# Patient Record
Sex: FEMALE | Race: WHITE | NOT HISPANIC OR LATINO
[De-identification: names, ages, dates, MRNs, and addresses within clinical notes are randomized per-mention and may not be internally consistent; named-entity substitution may affect disease eponyms.]

---

## 2019-07-18 ENCOUNTER — TRANSCRIPTION ENCOUNTER (OUTPATIENT)
Age: 54
End: 2019-07-18

## 2019-12-14 ENCOUNTER — TRANSCRIPTION ENCOUNTER (OUTPATIENT)
Age: 54
End: 2019-12-14

## 2021-12-20 ENCOUNTER — EMERGENCY (EMERGENCY)
Facility: HOSPITAL | Age: 56
LOS: 1 days | Discharge: HOME | End: 2021-12-20
Attending: EMERGENCY MEDICINE | Admitting: EMERGENCY MEDICINE
Payer: COMMERCIAL

## 2021-12-20 VITALS
HEIGHT: 66 IN | WEIGHT: 293 LBS | DIASTOLIC BLOOD PRESSURE: 89 MMHG | SYSTOLIC BLOOD PRESSURE: 132 MMHG | TEMPERATURE: 98 F | RESPIRATION RATE: 20 BRPM | HEART RATE: 78 BPM | OXYGEN SATURATION: 99 %

## 2021-12-20 LAB
ALBUMIN SERPL ELPH-MCNC: 4 G/DL — SIGNIFICANT CHANGE UP (ref 3.3–5)
ALP SERPL-CCNC: 67 U/L — SIGNIFICANT CHANGE UP (ref 30–120)
ALT FLD-CCNC: 25 U/L DA — SIGNIFICANT CHANGE UP (ref 10–60)
ANION GAP SERPL CALC-SCNC: 8 MMOL/L — SIGNIFICANT CHANGE UP (ref 5–17)
APTT BLD: 35.1 SEC — SIGNIFICANT CHANGE UP (ref 27.5–35.5)
AST SERPL-CCNC: 17 U/L — SIGNIFICANT CHANGE UP (ref 10–40)
BASOPHILS # BLD AUTO: 0.05 K/UL — SIGNIFICANT CHANGE UP (ref 0–0.2)
BASOPHILS NFR BLD AUTO: 0.5 % — SIGNIFICANT CHANGE UP (ref 0–2)
BILIRUB SERPL-MCNC: 0.3 MG/DL — SIGNIFICANT CHANGE UP (ref 0.2–1.2)
BUN SERPL-MCNC: 10 MG/DL — SIGNIFICANT CHANGE UP (ref 7–23)
CALCIUM SERPL-MCNC: 8.6 MG/DL — SIGNIFICANT CHANGE UP (ref 8.4–10.5)
CHLORIDE SERPL-SCNC: 103 MMOL/L — SIGNIFICANT CHANGE UP (ref 96–108)
CO2 SERPL-SCNC: 28 MMOL/L — SIGNIFICANT CHANGE UP (ref 22–31)
CREAT SERPL-MCNC: 0.77 MG/DL — SIGNIFICANT CHANGE UP (ref 0.5–1.3)
EOSINOPHIL # BLD AUTO: 0.12 K/UL — SIGNIFICANT CHANGE UP (ref 0–0.5)
EOSINOPHIL NFR BLD AUTO: 1.2 % — SIGNIFICANT CHANGE UP (ref 0–6)
GLUCOSE SERPL-MCNC: 90 MG/DL — SIGNIFICANT CHANGE UP (ref 70–99)
HCT VFR BLD CALC: 38.9 % — SIGNIFICANT CHANGE UP (ref 34.5–45)
HGB BLD-MCNC: 13.2 G/DL — SIGNIFICANT CHANGE UP (ref 11.5–15.5)
IMM GRANULOCYTES NFR BLD AUTO: 0.4 % — SIGNIFICANT CHANGE UP (ref 0–1.5)
INR BLD: 1.05 RATIO — SIGNIFICANT CHANGE UP (ref 0.88–1.16)
LYMPHOCYTES # BLD AUTO: 2.37 K/UL — SIGNIFICANT CHANGE UP (ref 1–3.3)
LYMPHOCYTES # BLD AUTO: 24.1 % — SIGNIFICANT CHANGE UP (ref 13–44)
MCHC RBC-ENTMCNC: 28.7 PG — SIGNIFICANT CHANGE UP (ref 27–34)
MCHC RBC-ENTMCNC: 33.9 GM/DL — SIGNIFICANT CHANGE UP (ref 32–36)
MCV RBC AUTO: 84.6 FL — SIGNIFICANT CHANGE UP (ref 80–100)
MONOCYTES # BLD AUTO: 0.66 K/UL — SIGNIFICANT CHANGE UP (ref 0–0.9)
MONOCYTES NFR BLD AUTO: 6.7 % — SIGNIFICANT CHANGE UP (ref 2–14)
NEUTROPHILS # BLD AUTO: 6.6 K/UL — SIGNIFICANT CHANGE UP (ref 1.8–7.4)
NEUTROPHILS NFR BLD AUTO: 67.1 % — SIGNIFICANT CHANGE UP (ref 43–77)
NRBC # BLD: 0 /100 WBCS — SIGNIFICANT CHANGE UP (ref 0–0)
PLATELET # BLD AUTO: 284 K/UL — SIGNIFICANT CHANGE UP (ref 150–400)
POTASSIUM SERPL-MCNC: 3.9 MMOL/L — SIGNIFICANT CHANGE UP (ref 3.5–5.3)
POTASSIUM SERPL-SCNC: 3.9 MMOL/L — SIGNIFICANT CHANGE UP (ref 3.5–5.3)
PROT SERPL-MCNC: 7.8 G/DL — SIGNIFICANT CHANGE UP (ref 6–8.3)
PROTHROM AB SERPL-ACNC: 12.7 SEC — SIGNIFICANT CHANGE UP (ref 10.6–13.6)
RBC # BLD: 4.6 M/UL — SIGNIFICANT CHANGE UP (ref 3.8–5.2)
RBC # FLD: 12.3 % — SIGNIFICANT CHANGE UP (ref 10.3–14.5)
SARS-COV-2 RNA SPEC QL NAA+PROBE: SIGNIFICANT CHANGE UP
SODIUM SERPL-SCNC: 139 MMOL/L — SIGNIFICANT CHANGE UP (ref 135–145)
TROPONIN I, HIGH SENSITIVITY RESULT: 6.5 NG/L — SIGNIFICANT CHANGE UP
WBC # BLD: 9.84 K/UL — SIGNIFICANT CHANGE UP (ref 3.8–10.5)
WBC # FLD AUTO: 9.84 K/UL — SIGNIFICANT CHANGE UP (ref 3.8–10.5)

## 2021-12-20 PROCEDURE — 99285 EMERGENCY DEPT VISIT HI MDM: CPT

## 2021-12-20 PROCEDURE — 71045 X-RAY EXAM CHEST 1 VIEW: CPT

## 2021-12-20 PROCEDURE — 87635 SARS-COV-2 COVID-19 AMP PRB: CPT

## 2021-12-20 PROCEDURE — 36415 COLL VENOUS BLD VENIPUNCTURE: CPT

## 2021-12-20 PROCEDURE — 93010 ELECTROCARDIOGRAM REPORT: CPT

## 2021-12-20 PROCEDURE — 84484 ASSAY OF TROPONIN QUANT: CPT

## 2021-12-20 PROCEDURE — 93005 ELECTROCARDIOGRAM TRACING: CPT

## 2021-12-20 PROCEDURE — 71045 X-RAY EXAM CHEST 1 VIEW: CPT | Mod: 26

## 2021-12-20 PROCEDURE — 85730 THROMBOPLASTIN TIME PARTIAL: CPT

## 2021-12-20 PROCEDURE — 85610 PROTHROMBIN TIME: CPT

## 2021-12-20 PROCEDURE — 99283 EMERGENCY DEPT VISIT LOW MDM: CPT | Mod: 25

## 2021-12-20 PROCEDURE — 80053 COMPREHEN METABOLIC PANEL: CPT

## 2021-12-20 PROCEDURE — 85025 COMPLETE CBC W/AUTO DIFF WBC: CPT

## 2021-12-20 NOTE — ED PROVIDER NOTE - CARE PROVIDER_API CALL
Matt Laguna (MD)  Cardiovascular Disease; Internal Medicine  [address]  [place] [address]  Phone: [phone]  Fax: [phone]  Follow Up Time: 1-3 Days

## 2021-12-20 NOTE — ED PROVIDER NOTE - OBJECTIVE STATEMENT
57 y/o female with PMHx of PCOS presents to the ED for evaluation s/p receiving Covid-19 booster. Pt states a few minutes after receiving her Covid-19 booster, she started palpitations and chest pain, described as a tightness. On arrival to ED, pt states symptoms have now resolved. Denies having an adverse reaction to her first two doses, besides feeling feverish afterwards. Not on any daily medications. PCP: Dr. Sultana. No other complaints at this time.

## 2021-12-20 NOTE — ED ADULT NURSE NOTE - WILL THE PATIENT ACCEPT THE PFIZER COVID-19 VACCINE IF ELIGIBLE AND IT IS AVAILABLE?
Not applicable Inflammation Suggestive Of Cancer Camouflage Histology Text: There was a dense lymphocytic infiltrate which prevented adequate histologic evaluation of adjacent structures.

## 2021-12-20 NOTE — ED PROVIDER NOTE - CLINICAL SUMMARY MEDICAL DECISION MAKING FREE TEXT BOX
Pt with chest tightness after Covid-19 booster. Symptoms now resolved. Plan for labs, CXR, EKG, and observe.

## 2021-12-20 NOTE — ED PROVIDER NOTE - INTERNATIONAL TRAVEL
Duration Of Freeze Thaw-Cycle (Seconds): 5
Post-Care Instructions: I reviewed with the patient in detail post-care instructions. Patient is to wear sunprotection, and avoid picking at any of the treated lesions. Pt may apply Vaseline to crusted or scabbing areas.
Render Post-Care Instructions In Note?: no
Detail Level: Detailed
Consent: The patient's consent was obtained including but not limited to risks of crusting, scabbing, blistering, scarring, darker or lighter pigmentary change, recurrence, incomplete removal and infection.
No

## 2021-12-20 NOTE — ED ADULT NURSE NOTE - OBJECTIVE STATEMENT
patient has a booster shot today and started c/o chest pain 5 mins after the shot, Patient denies sick contacts/ no fever/chills/cough at this time, denies SOB and no acute distress. IV accessed and tolerating. Labs drawn & sent results pending. ekg done. will continue to monitor.

## 2021-12-20 NOTE — ED PROVIDER NOTE - PATIENT PORTAL LINK FT
You can access the FollowMyHealth Patient Portal offered by [facility] by registering at the following website: [email or web address]. By joining [employer]’s FollowMyHealth portal, you will also be able to view your health information using other applications (apps) compatible with our system.

## 2022-11-14 NOTE — ED ADULT NURSE NOTE - NEURO GAIT
steady Propranolol Pregnancy And Lactation Text: This medication is Pregnancy Category C and it isn't known if it is safe during pregnancy. It is excreted in breast milk.

## 2023-02-10 ENCOUNTER — NON-APPOINTMENT (OUTPATIENT)
Age: 58
End: 2023-02-10

## 2023-05-19 NOTE — ED PROVIDER NOTE - CROS ED CARDIOVAS ALL NEG
Patient is called and he confirms appointment with provider for 05/25.  He will come right after his cardiology appointment.   - - -

## 2023-12-17 ENCOUNTER — EMERGENCY (EMERGENCY)
Facility: HOSPITAL | Age: 58
LOS: 1 days | Discharge: HOME | End: 2023-12-17
Attending: EMERGENCY MEDICINE | Admitting: EMERGENCY MEDICINE
Payer: COMMERCIAL

## 2023-12-17 VITALS
HEART RATE: 85 BPM | RESPIRATION RATE: 15 BRPM | DIASTOLIC BLOOD PRESSURE: 91 MMHG | TEMPERATURE: 98 F | SYSTOLIC BLOOD PRESSURE: 152 MMHG | WEIGHT: 154.98 LBS | HEIGHT: 66 IN | OXYGEN SATURATION: 99 %

## 2023-12-17 PROBLEM — E28.2 POLYCYSTIC OVARIAN SYNDROME: Chronic | Status: ACTIVE | Noted: 2021-12-20

## 2023-12-17 LAB
HCG UR QL: NEGATIVE — SIGNIFICANT CHANGE UP
HCG UR QL: NEGATIVE — SIGNIFICANT CHANGE UP

## 2023-12-17 PROCEDURE — 81025 URINE PREGNANCY TEST: CPT

## 2023-12-17 PROCEDURE — 73630 X-RAY EXAM OF FOOT: CPT

## 2023-12-17 PROCEDURE — 99283 EMERGENCY DEPT VISIT LOW MDM: CPT

## 2023-12-17 PROCEDURE — 99284 EMERGENCY DEPT VISIT MOD MDM: CPT

## 2023-12-17 PROCEDURE — 73630 X-RAY EXAM OF FOOT: CPT | Mod: 26,RT

## 2023-12-17 RX ORDER — ACETAMINOPHEN 500 MG
650 TABLET ORAL ONCE
Refills: 0 | Status: COMPLETED | OUTPATIENT
Start: 2023-12-17 | End: 2023-12-17

## 2023-12-17 RX ADMIN — Medication 650 MILLIGRAM(S): at 11:51

## 2023-12-17 RX ADMIN — Medication 650 MILLIGRAM(S): at 11:38

## 2023-12-17 NOTE — ED PROCEDURE NOTE - CPROC ED TIME OUT STATEMENT1
“Patient's name, , procedure and correct site were confirmed during the [place] Timeout.” “Patient's name, , procedure and correct site were confirmed during the [place] Timeout.”
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
## 2023-12-17 NOTE — ED ADULT NURSE NOTE - NSFALLUNIVINTERV_ED_ALL_ED
Bed/Stretcher in lowest position, wheels locked, appropriate side rails in place/Call bell, personal items and telephone in reach/Instruct patient to call for assistance before getting out of bed/chair/stretcher/Non-slip footwear applied when patient is off stretcher/[place] to call system/Physically safe environment - no spills, clutter or unnecessary equipment/Purposeful proactive rounding/Room/bathroom lighting operational, light cord in reach Bed/Stretcher in lowest position, wheels locked, appropriate side rails in place/Call bell, personal items and telephone in reach/Instruct patient to call for assistance before getting out of bed/chair/stretcher/Non-slip footwear applied when patient is off stretcher/[place] to call system/Physically safe environment - no spills, clutter or unnecessary equipment/Purposeful proactive rounding/Room/bathroom lighting operational, light cord in reach
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
## 2023-12-17 NOTE — ED PROVIDER NOTE - MUSCULOSKELETAL, MLM
Spine appears normal, range of motion is not limited, no muscle or joint tenderness. Right foot without visible bruising or deformity.  Full range of motion pulses and sensation intact throughout.

## 2023-12-17 NOTE — ED PROVIDER NOTE - NSFOLLOWUPINSTRUCTIONS_ED_ALL_ED_FT
Foot Sprain    WHAT YOU NEED TO KNOW:    A foot sprain is a stretched or torn ligament in the foot or toe. Ligaments are tough tissues that connect bones.    DISCHARGE INSTRUCTIONS:    Return to the emergency department if:    You have numbness or tingling below the injury, such as in your toes.    The skin on your injured foot is blue or pale.    You have increased pain, even after you take pain medicine.  Call your doctor if:    You have new weakness in your foot.    You have new or increased swelling in your foot.    You have new or increased stiffness when you move your injured foot.    You have questions or concerns about your condition or care.  Medicines:    NSAIDs, such as ibuprofen, help decrease swelling, pain, and fever. This medicine is available with or without a doctor's order. NSAIDs can cause stomach bleeding or kidney problems in certain people. If you take blood thinner medicine, always ask if NSAIDs are safe for you. Always read the medicine label and follow directions. Do not give these medicines to children younger than 6 months without direction from a healthcare provider.    Take your medicine as directed. Contact your healthcare provider if you think your medicine is not helping or if you have side effects. Tell your provider if you are allergic to any medicine. Keep a list of the medicines, vitamins, and herbs you take. Include the amounts, and when and why you take them. Bring the list or the pill bottles to follow-up visits. Carry your medicine list with you in case of an emergency.  Self-care:    Rest your foot. Limit movement in your sprained foot for the first 2 to 3 days. You might need crutches to take weight off your injured foot as it heals. Use crutches as directed.  Walking with Crutches      Apply ice on your foot for 15 to 20 minutes every hour or as directed. Use an ice pack, or put crushed ice in a plastic bag. Cover it with a towel. Ice helps prevent tissue damage and decreases swelling and pain.    Compress your foot. You may need to use tape or an elastic bandage to support your foot if you have a mild sprain. You may need a splint on your foot for support if your sprain is severe. Wear your splint for as many days as directed.    Elevate your foot above the level of your heart as often as you can. This will help decrease swelling and pain. Prop your foot on pillows or blankets to keep it elevated comfortably.  Elevate Leg  Exercise your foot: You may be given exercises to improve your strength and to help decrease stiffness. The exercises and physical therapy can help restore strength and increase the range of motion in your foot. Ask your healthcare provider when you can return to your normal activities or play sports.    Prevent another foot sprain:    Warm up and stretch before you exercise.    Do not exercise when you feel pain or are tired.    Wear equipment to protect yourself when you play sports.  Follow up with your doctor as directed: Write down your questions so you remember to ask them during your visits.

## 2023-12-17 NOTE — ED PROVIDER NOTE - CARE PROVIDER_API CALL
Wale Shaw.  Orthopaedic Surgery  [address], [address]  [place] [address]  Phone: [phone]  Fax: [phone]  Follow Up Time: 1-3 Days   Wale Shaw.  Orthopaedic Surgery  [address], [address]  [place] [address]  Phone: [phone]  Fax: [phone]  Follow Up Time: 1-3 Days
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
## 2023-12-17 NOTE — ED PROVIDER NOTE - ENMT, MLM
Airway patent, Nasal mucosa clear. Mouth with normal mucosa. Throat has no vesicles, no oropharyngeal exudates and uvula is midline.  Scalp is atraumatic nontender nondeformed.  Neck is supple full range of motion intact nontender nondeformed.

## 2023-12-17 NOTE — ED PROVIDER NOTE - PROVIDER TOKENS
PROVIDER:[TOKEN:[[id number]],FOLLOWUP:[1-3 Days]] PROVIDER:[TOKEN:[[id number]],FOLLOWUP:[1-3 Days]]

## 2023-12-17 NOTE — ED ADULT NURSE NOTE - DOES PATIENT HAVE ADVANCE DIRECTIVE
ED Attending Physician Note      Patient : Alysha Barreto Age: 36 year old Sex: female   MRN: [id number] Encounter Date: 1/13/2021      History     Chief Complaint   Patient presents with   • Tingling   • Facial Numbness     HPI  36-year-old woman with history of Bell's palsy with mild residual left-sided facial weakness presenting with left upper lip numbness and a tingling sensation to the back of her head since 2 PM yesterday.  States that at 2 PM yesterday she started having some left upper lip/numbness and noted some mild swelling to the right eye.  During this time she had a mild headache as well.  Symptoms improved slightly but today she had recurrence of the symptoms that started around 8 PM.  This time she notes that she had left face/cheek swelling associated with the headache and left with numbness.  She states that her headache feels like a throbbing migraine sensation to the back of her with associated tingling.  She denies any history of complex migraines but states that this feels like her usual headaches.  She denies any numbness weakness or tingling in her arms or legs.  No chest pain, mild nausea.  No vomiting.   No changes in her vision or speech.    PMH: Bell's palsy  Med: None  All: No known drug allergies  Fam: Noncontributory  Soc Hx: Social alcohol no tobacco no illicit drugs      ROS  General: No fever  Skin: No rash  Eye: No recent vision problems  ENMT: No sore throat  Respiratory: No shortness of breath  CV: No chest pain  GI: No abdominal pain  : No dysuria  MSK: No joint pain  Neurologic: + headache     Physical Exam     ED Triage Vitals [01/13/21 2145]   ED Triage Vitals Group      Temp 98.9 °F (37.2 °C)      Heart Rate 87      Resp 18      BP (!) 138/93      SpO2 99 %      EtCO2 mmHg       Height 5' 6\" (1.676 m)      Weight 257 lb 15 oz (117 kg)      Weight Scale Used Standing scale      BMI (Calculated) 41.63      IBW/kg (Calculated) 59.3       Pulse Ox is 99% on Room Air which is  normal for this patient    General: Alert, no acute distress  Skin: Warm, dry  Head: Normocephalic atraumatic  Eye: PERRL, EOMI  ENMT: Oral mucosa moist, no facial swelling noted  Neck: Supple, trachea midline  Cardiovascular: Regular rate, rhythm, S1, S2  Respiratory: Lungs CTA, non-labored respirations, symmetrical expansion  GI: Soft, nontender, nondistended  MSK: Normal ROM, no swelling, no deformity  Neuro: Cranial nerves II through XII intact except a slight decrease in wrinkling of her L forehead - old per patient, normal lip motions, level of consciousness appropriate for age, motor strength reveals 5 out of 5 strength to all 4 extremities with flexion and extension, sensation to all 4 extremities is intact, negative finger-to-nose testing speech normal, neg pronator drift, gait normal, NIHSS0  Psychiatric: Appropriate mood and affect      ED Course     Procedures    Lab Results     Results for orders placed or performed during the hospital encounter of 01/13/21   Comprehensive Metabolic Panel   Result Value Ref Range    Fasting Status      Sodium 136 135 - 145 mmol/L    Potassium 3.8 3.4 - 5.1 mmol/L    Chloride 100 98 - 107 mmol/L    Carbon Dioxide 27 21 - 32 mmol/L    Anion Gap 13 10 - 20 mmol/L    Glucose 104 (H) 65 - 99 mg/dL    BUN 13 6 - 20 mg/dL    Creatinine 0.63 0.51 - 0.95 mg/dL    Glomerular Filtration Rate >90 >90 mL/min/1.73m2    BUN/ Creatinine Ratio 21 7 - 25    Calcium 9.2 8.4 - 10.2 mg/dL    Bilirubin, Total 0.2 0.2 - 1.0 mg/dL    GOT/AST 24 <=37 Units/L    GPT/ALT 42 <64 Units/L    Alkaline Phosphatase 111 45 - 117 Units/L    Albumin 4.0 3.6 - 5.1 g/dL    Protein, Total 8.4 (H) 6.4 - 8.2 g/dL    Globulin 4.4 (H) 2.0 - 4.0 g/dL    A/G Ratio 0.9 (L) 1.0 - 2.4   Troponin I Ultra Sensitive   Result Value Ref Range    Troponin I, Ultra Sensitive <0.02 <=0.04 ng/mL   CBC with Automated Differential (performable only)   Result Value Ref Range    WBC 14.4 (H) 4.2 - 11.0 K/mcL    RBC 4.87 4.00 -  5.20 mil/mcL    HGB 13.2 12.0 - 15.5 g/dL    HCT 41.5 36.0 - 46.5 %    MCV 85.2 78.0 - 100.0 fl    MCH 27.1 26.0 - 34.0 pg    MCHC 31.8 (L) 32.0 - 36.5 g/dL    RDW-CV 13.2 11.0 - 15.0 %    RDW-SD 40.5 39.0 - 50.0 fL     140 - 450 K/mcL    NRBC 0 <=0 /100 WBC    Neutrophil, Percent 58 %    Lymphocytes, Percent 34 %    Mono, Percent 6 %    Eosinophils, Percent 1 %    Basophils, Percent 1 %    Immature Granulocytes 0 %    Absolute Neutrophils 8.3 (H) 1.8 - 7.7 K/mcL    Absolute Lymphocytes 4.9 (H) 1.0 - 4.8 K/mcL    Absolute Monocytes 0.9 0.3 - 0.9 K/mcL    Absolute Eosinophils  0.2 0.0 - 0.5 K/mcL    Absolute Basophils 0.1 0.0 - 0.3 K/mcL    Absolute Immmature Granulocytes 0.1 0.0 - 0.2 K/mcL       EKG Results   EKG is normal sinus rhythm at a rate of 88 with no acute ischemic changes, QTC is 384    Radiology Results     Imaging Results          CT HEAD WO CONTRAST (Final result)  Result time 01/13/21 22:50:31    Final result                 Impression:    No acute intracranial process.  There is no acute sinusitis..        Electronically Signed by: KATHLEEN ALTAMIRNAO MD   Signed on: 1/13/2021 10:50 PM                Narrative:    EXAMINATION: CT HEAD WO CONTRAST.    CLINICAL INDICATION: facial numbness.    COMPARISON: None.    Axial images of the head were obtained without administration of contrast  material.  Adjustment of the mA and/or kV was done based on the patient's size.     The ventricles are normal for age.  Basal cisterns and sulci over the  convexities are within normal limits.  No bleed, no shift and no mass  effect is seen.  No acute cortical infarct is appreciated.  Visualized  paranasal sinuses are clear.                                ED Medication Orders (From admission, onward)    Ordered Start     Status Ordering Provider    01/13/21 2221 01/13/21 2230  metoCLOPramide (REGLAN) injection 10 mg  ONCE      Last MAR action: Given JUSTYNA MARIA    01/13/21 2221 01/13/21 2230  diphenhydrAMINE  (BENADRYL) injection 25 mg  ONCE      Last MAR action: Given BEVERLY LIU    01/13/21 2221 01/13/21 2230  sodium chloride (NORMAL SALINE) 0.9 % bolus 1,000 mL  ONCE      Last MAR action: Stopped BEVERLY LIU          [facility]  36-year-old woman with history of Bell's palsy with mild residual left-sided facial weakness presenting with left upper lip numbness and a tingling sensation to the back of her head since 2 PM yesterday.  Afebrile with stable vital signs.  Physical exam is unremarkable with a nonfocal neuro exam.  There is a slight decrease wrinkling and her left forehead with eyebrow raise however patient states this is old.  Labs notable for white blood cell count of 14.4 but otherwise unremarkable.  EKG is normal sinus rhythm patient does have a headache and was given a migraine cocktail with improvement in her tingling as well as her headache.  Her CT brain is within normal limits.  Patient was discharged home in stable conditions.  She will follow-up with her primary care physician within the next.  Return parameters were given.       Clinical Impression     ED Diagnosis   1. Other migraine without status migrainosus, not intractable         Disposition        Discharge 1/13/2021 11:09 PM  Alysha Barreto discharge to home/self care.          Beverly Liu MD   1/14/2021 1:53 AM                        Beverly Liu MD  01/14/21 0158     No

## 2023-12-17 NOTE — ED PROVIDER NOTE - PATIENT PORTAL LINK FT
You can access the FollowMyHealth Patient Portal offered by [facility] by registering at the following website: [email or web address]. By joining [employer]’s FollowMyHealth portal, you will also be able to view your health information using other applications (apps) compatible with our system. You can access the FollowMyHealth Patient Portal offered by [facility] by registering at the following website: [email or web address]. By joining [employer]’s FollowMyHealth portal, you will also be able to view your health information using other applications (apps) compatible with our system.

## 2024-03-27 PROBLEM — Z00.00 ENCOUNTER FOR PREVENTIVE HEALTH EXAMINATION: Status: ACTIVE | Noted: 2024-03-27

## 2024-03-28 ENCOUNTER — APPOINTMENT (OUTPATIENT)
Dept: ORTHOPEDIC SURGERY | Facility: CLINIC | Age: 59
End: 2024-03-28
Payer: COMMERCIAL

## 2024-03-28 VITALS — WEIGHT: 155 LBS | BODY MASS INDEX: 24.91 KG/M2 | HEIGHT: 66 IN

## 2024-03-28 DIAGNOSIS — Z86.59 PERSONAL HISTORY OF OTHER MENTAL AND BEHAVIORAL DISORDERS: ICD-10-CM

## 2024-03-28 DIAGNOSIS — Z78.9 OTHER SPECIFIED HEALTH STATUS: ICD-10-CM

## 2024-03-28 PROCEDURE — 73030 X-RAY EXAM OF SHOULDER: CPT | Mod: RT

## 2024-03-28 PROCEDURE — 72040 X-RAY EXAM NECK SPINE 2-3 VW: CPT

## 2024-03-28 PROCEDURE — 20610 DRAIN/INJ JOINT/BURSA W/O US: CPT | Mod: RT

## 2024-03-28 PROCEDURE — 99204 OFFICE O/P NEW MOD 45 MIN: CPT | Mod: 25

## 2024-03-28 PROCEDURE — 73630 X-RAY EXAM OF FOOT: CPT | Mod: RT

## 2024-03-28 RX ORDER — PIROXICAM 20 MG/1
20 CAPSULE ORAL
Qty: 30 | Refills: 4 | Status: ACTIVE | COMMUNITY
Start: 2024-03-28 | End: 1900-01-01

## 2024-03-28 RX ORDER — METHYLPHENIDATE HYDROCHLORIDE 20 MG/1
20 CAPSULE, EXTENDED RELEASE ORAL
Refills: 0 | Status: ACTIVE | COMMUNITY
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
## 2024-03-28 NOTE — PROCEDURE
[Large Joint Injection] : Large joint injection [Subacromial Space] : subacromial space [Right] : of the right [Pain] : pain [Inflammation] : inflammation [Betadine] : betadine [Ethyl Chloride sprayed topically] : ethyl chloride sprayed topically [___ cc    1%] : Lidocaine ~Vcc of 1%  [___ cc    40mg] : Methylprednisolone (Depomedrol) ~Vcc of 40 mg  [] : Patient tolerated procedure well [Call if redness, pain or fever occur] : call if redness, pain or fever occur [Apply ice for 15min out of every hour for the next 12-24 hours as tolerated] : apply ice for 15 minutes out of every hour for the next 12-24 hours as tolerated [Risks, benefits, alternatives discussed / Verbal consent obtained] : the risks benefits, and alternatives have been discussed, and verbal consent was obtained

## 2024-03-28 NOTE — PHYSICAL EXAM
[Normal Mood and Affect] : normal mood and affect [Able to Communicate] : able to communicate [Well Developed] : well developed [Well Nourished] : well nourished [NL (45)] : extension 45 degrees [Rotation to left] : rotation to left [Rotation to right] : rotation to right [NL (0-90)] : full external rotation 0-90 degrees [NL (20)] : MTP joint PF 20 degrees [5___] : eversion 5[unfilled]/5 [Disc space narrowing] : Disc space narrowing [FreeTextEntry1] : Multilevel DDD from C4-C7 [de-identified] : left lateral flexion 30 degrees [de-identified] : left lateral rotation 75 degrees [TWNoteComboBox6] : right lateral rotation 60 degrees [There are no fractures, subluxations or dislocations. No significant abnormalities are seen] : There are no fractures, subluxations or dislocations. No significant abnormalities are seen [Type 2 acromion] : Type 2 acromion [TWNoteComboBox4] : passive forward flexion 170 degrees [de-identified] : + Jessica [] : no peroneal tendon tenderness [Right] : right foot [FreeTextEntry3] : small exostosis over 1st MT head [Degenerative change] : Degenerative change [FreeTextEntry9] : Pain at extremes of PF [de-identified] : moderate to severe assymetric narrowing of the 1st MTP joint with bone spur formation [de-identified] : MTP joint DF 20 degrees
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
## 2024-03-28 NOTE — PLAN
[TextEntry] : We discussed at length with the patient the options for treatment.  We discussed conservative care including physical therapy, acupuncture, massage therapy, and chiropractic care.  We discussed injection therapy and even surgical intervention should the patient fail conservative care.  We discussed risks, benefits, complications, alternatives, outcomes, expectations.  All questions answered.  Start Feldene daily.  Patient is being referred for physical therapy for various modalities.  Medication was injected into the rt SA arch. After verbal consent using sterile preparation and technique. The risks, benefits, and alternatives to cortisone injection were explained in full to the patient. Risks outlined include but are not limited to infection, sepsis, bleeding, scarring, skin discoloration, temporary increase in pain, syncopal episode, failure to resolve symptoms, allergic reaction, symptom recurrence, and elevation of blood sugar in diabetics. Patient understood the risks. All questions were answered. After discussion of options, patient requested an injection. Oral informed consent was obtained and sterile prep was done of the injection site. Sterile technique was utilized for the procedure including the preparation of the solutions used for the injection. Patient tolerated the procedure well. Advised to ice the injection site this evening. Prep with Betadine locally to site. Sterile technique used. Patient tolerated procedure well. Post Procedure Instructions: Patient was advised to call if redness, pain, or fever occur and apply ice for 15 min. out of every hour for the next 12-24 hours as tolerated.  The patient was instructed on the importance of ice and elevation of the extremity to decrease swelling and pain.  If no improvement, consider MRI.

## 2024-04-28 NOTE — ED ADULT TRIAGE NOTE - INTERNATIONAL TRAVEL
Pt has been having abdominal pain for the past 4 days. He has IBS. He has been taking \"Moran colon 4 in 1\" for gut health. Has been having sweats, nausea, and diarrhea Thursday through Friday. Had some blood in his stool. Felt faint yesterday. Pain is epigastric and hurts worse when he pushes on it.    No

## 2024-05-02 ENCOUNTER — APPOINTMENT (OUTPATIENT)
Dept: ORTHOPEDIC SURGERY | Facility: CLINIC | Age: 59
End: 2024-05-02

## 2024-05-02 NOTE — PHYSICAL EXAM
[FreeTextEntry1] : Multilevel DDD from C4-C7 [de-identified] : left lateral flexion 30 degrees [de-identified] : left lateral rotation 75 degrees [TWNoteComboBox6] : right lateral rotation 60 degrees [de-identified] : + Jessica [TWNoteComboBox4] : passive forward flexion 170 degrees [] : no peroneal tendon tenderness [FreeTextEntry3] : small exostosis over 1st MT head [FreeTextEntry9] : Pain at extremes of PF [de-identified] : moderate to severe assymetric narrowing of the 1st MTP joint with bone spur formation [de-identified] : MTP joint DF 20 degrees
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
## 2024-05-07 NOTE — ED ADULT NURSE NOTE - NS_NURSE_DISC_TEACHING_YN_ED_ALL_ED
Eloy,    Currently Fide Ar is scheduled for her Service-to SERVICE TO ALLERGY & IMMUNOLOGY  order on 9/19/2024. Due to the priority of the order, please review to determine that the appointment date aligns with the urgency of the diagnosis.    Thank you,  Soila Biggs    Care Coordination Operations Team  [facility]   
Yes, ok to schedule. She should continue to avoid peanut and carry epinephrine at all times. (Prior testing by pediatrician documents sensitization to peanut.)  
Yes

## 2024-05-21 ENCOUNTER — APPOINTMENT (OUTPATIENT)
Dept: ORTHOPEDIC SURGERY | Facility: CLINIC | Age: 59
End: 2024-05-21
Payer: COMMERCIAL

## 2024-05-21 PROCEDURE — 99214 OFFICE O/P EST MOD 30 MIN: CPT

## 2024-05-21 NOTE — PROCEDURE
[Subacromial Space] : subacromial space [Pain] : pain [Inflammation] : inflammation [Betadine] : betadine [Ethyl Chloride sprayed topically] : ethyl chloride sprayed topically [___ cc    1%] : Lidocaine ~Vcc of 1%  [___ cc    40mg] : Methylprednisolone (Depomedrol) ~Vcc of 40 mg  [] : Patient tolerated procedure well [Call if redness, pain or fever occur] : call if redness, pain or fever occur [Apply ice for 15min out of every hour for the next 12-24 hours as tolerated] : apply ice for 15 minutes out of every hour for the next 12-24 hours as tolerated [Risks, benefits, alternatives discussed / Verbal consent obtained] : the risks benefits, and alternatives have been discussed, and verbal consent was obtained [Large Joint Injection] : Large joint injection [Right] : of the right

## 2024-05-25 ENCOUNTER — APPOINTMENT (OUTPATIENT)
Dept: MRI IMAGING | Facility: CLINIC | Age: 59
End: 2024-05-25
Payer: COMMERCIAL

## 2024-05-25 PROCEDURE — 73221 MRI JOINT UPR EXTREM W/O DYE: CPT | Mod: RT

## 2024-05-31 ENCOUNTER — APPOINTMENT (OUTPATIENT)
Dept: ORTHOPEDIC SURGERY | Facility: CLINIC | Age: 59
End: 2024-05-31
Payer: COMMERCIAL

## 2024-05-31 VITALS — WEIGHT: 155 LBS | HEIGHT: 66 IN | BODY MASS INDEX: 24.91 KG/M2

## 2024-05-31 DIAGNOSIS — M75.41 IMPINGEMENT SYNDROME OF RIGHT SHOULDER: ICD-10-CM

## 2024-05-31 PROCEDURE — 99214 OFFICE O/P EST MOD 30 MIN: CPT
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
## 2024-05-31 NOTE — PLAN
[TextEntry] : We discussed at length with the patient the options for treatment.  We discussed conservative care including physical therapy, acupuncture, massage therapy, and chiropractic care.  We discussed injection therapy and even surgical intervention should the patient fail conservative care.  We discussed risks, benefits, complications, alternatives, outcomes, expectations.  All questions answered.  Pt needs to increase her PT to 3-4x/week to address her frozen shoulder.

## 2024-05-31 NOTE — PHYSICAL EXAM
[Normal Mood and Affect] : normal mood and affect [Able to Communicate] : able to communicate [Well Developed] : well developed [Well Nourished] : well nourished [NL (45)] : extension 45 degrees [Rotation to left] : rotation to left [Rotation to right] : rotation to right [Disc space narrowing] : Disc space narrowing [There are no fractures, subluxations or dislocations. No significant abnormalities are seen] : There are no fractures, subluxations or dislocations. No significant abnormalities are seen [Type 2 acromion] : Type 2 acromion [NL (20)] : MTP joint PF 20 degrees [5___] : eversion 5[unfilled]/5 [Right] : right foot [Degenerative change] : Degenerative change [FreeTextEntry1] : Multilevel DDD from C4-C7 [de-identified] : left lateral flexion 30 degrees [TWNoteComboBox6] : right lateral rotation 60 degrees [de-identified] : + Jessica [TWNoteComboBox4] : passive forward flexion 100 degrees [de-identified] : external rotation 70 degrees [] : no peroneal tendon tenderness [FreeTextEntry3] : small exostosis over 1st MT head [FreeTextEntry9] : Pain at extremes of PF [de-identified] : moderate to severe assymetric narrowing of the 1st MTP joint with bone spur formation [de-identified] : MTP joint DF 20 degrees
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
## 2024-05-31 NOTE — PHYSICAL EXAM
[Normal Mood and Affect] : normal mood and affect [Able to Communicate] : able to communicate [Well Developed] : well developed [Well Nourished] : well nourished [NL (45)] : extension 45 degrees [Rotation to left] : rotation to left [Rotation to right] : rotation to right [Disc space narrowing] : Disc space narrowing [NL (0-90)] : full external rotation 0-90 degrees [There are no fractures, subluxations or dislocations. No significant abnormalities are seen] : There are no fractures, subluxations or dislocations. No significant abnormalities are seen [Type 2 acromion] : Type 2 acromion [NL (20)] : MTP joint PF 20 degrees [5___] : eversion 5[unfilled]/5 [Right] : right foot [Degenerative change] : Degenerative change [FreeTextEntry1] : Multilevel DDD from C4-C7 [de-identified] : left lateral flexion 30 degrees [de-identified] : left lateral rotation 75 degrees [TWNoteComboBox6] : right lateral rotation 60 degrees [de-identified] : + Jessica [TWNoteComboBox4] : passive forward flexion 170 degrees [] : no peroneal tendon tenderness [FreeTextEntry3] : small exostosis over 1st MT head [FreeTextEntry9] : Pain at extremes of PF [de-identified] : moderate to severe assymetric narrowing of the 1st MTP joint with bone spur formation [de-identified] : MTP joint DF 20 degrees

## 2024-05-31 NOTE — PLAN
[TextEntry] : The patient was advised of the diagnosis. The natural history of the pathology was explained in full to the patient in layman's terms. All questions were answered. The risks and benefits of surgical and non-surgical treatment alternatives were explained in full to the patient.  Patient was advised to continue with physical therapy.  Will obtain an MRI rt shoulder.

## 2024-06-28 ENCOUNTER — APPOINTMENT (OUTPATIENT)
Dept: ORTHOPEDIC SURGERY | Facility: CLINIC | Age: 59
End: 2024-06-28
Payer: COMMERCIAL

## 2024-06-28 VITALS — HEIGHT: 66 IN | WEIGHT: 155 LBS | BODY MASS INDEX: 24.91 KG/M2

## 2024-06-28 DIAGNOSIS — M75.01 ADHESIVE CAPSULITIS OF RIGHT SHOULDER: ICD-10-CM

## 2024-06-28 DIAGNOSIS — M20.21 HALLUX RIGIDUS, RIGHT FOOT: ICD-10-CM

## 2024-06-28 DIAGNOSIS — M47.812 SPONDYLOSIS W/OUT MYELOPATHY OR RADICULOPATHY, CERVICAL REGION: ICD-10-CM

## 2024-06-28 DIAGNOSIS — S43.431A SUPERIOR GLENOID LABRUM LESION OF RIGHT SHOULDER, INITIAL ENCOUNTER: ICD-10-CM

## 2024-06-28 DIAGNOSIS — M54.2 CERVICALGIA: ICD-10-CM

## 2024-06-28 PROCEDURE — 99214 OFFICE O/P EST MOD 30 MIN: CPT

## 2024-08-01 ENCOUNTER — APPOINTMENT (OUTPATIENT)
Dept: ORTHOPEDIC SURGERY | Facility: CLINIC | Age: 59
End: 2024-08-01
Payer: COMMERCIAL

## 2024-08-01 DIAGNOSIS — M75.01 ADHESIVE CAPSULITIS OF RIGHT SHOULDER: ICD-10-CM

## 2024-08-01 DIAGNOSIS — M47.812 SPONDYLOSIS W/OUT MYELOPATHY OR RADICULOPATHY, CERVICAL REGION: ICD-10-CM

## 2024-08-01 DIAGNOSIS — M54.2 CERVICALGIA: ICD-10-CM

## 2024-08-01 DIAGNOSIS — M20.21 HALLUX RIGIDUS, RIGHT FOOT: ICD-10-CM

## 2024-08-01 DIAGNOSIS — S43.431A SUPERIOR GLENOID LABRUM LESION OF RIGHT SHOULDER, INITIAL ENCOUNTER: ICD-10-CM

## 2024-08-01 PROCEDURE — 99213 OFFICE O/P EST LOW 20 MIN: CPT

## 2024-08-01 RX ORDER — PROGESTERONE 200 MG/1
CAPSULE ORAL
Refills: 0 | Status: ACTIVE | COMMUNITY
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
## 2024-08-01 NOTE — PHYSICAL EXAM
[Normal Mood and Affect] : normal mood and affect [Able to Communicate] : able to communicate [Well Developed] : well developed [Well Nourished] : well nourished [NL (45)] : extension 45 degrees [Rotation to left] : rotation to left [Rotation to right] : rotation to right [Disc space narrowing] : Disc space narrowing [There are no fractures, subluxations or dislocations. No significant abnormalities are seen] : There are no fractures, subluxations or dislocations. No significant abnormalities are seen [Type 2 acromion] : Type 2 acromion [NL (20)] : MTP joint PF 20 degrees [5___] : eversion 5[unfilled]/5 [Right] : right foot [Degenerative change] : Degenerative change [FreeTextEntry1] : Multilevel DDD from C4-C7 [de-identified] : left lateral flexion 30 degrees [TWNoteComboBox6] : right lateral rotation 60 degrees [de-identified] : + Jessica [TWNoteComboBox4] : passive forward flexion 170 degrees [de-identified] : external rotation 85 degrees [] : no peroneal tendon tenderness [FreeTextEntry3] : small exostosis over 1st MT head [FreeTextEntry9] : Pain at extremes of PF [de-identified] : moderate to severe assymetric narrowing of the 1st MTP joint with bone spur formation [de-identified] : MTP joint DF 20 degrees
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
## 2024-08-01 NOTE — PLAN
[TextEntry] : The patient was advised of the diagnosis. The natural history of the pathology was explained in full to the patient in layman's terms. All questions were answered. The risks and benefits of surgical and non-surgical treatment alternatives were explained in full to the patient.   Patient was advised to continue with physical therapy mainly concentrating on her right shoulder.   Continue Feldene.

## 2024-08-01 NOTE — PHYSICAL EXAM
[Normal Mood and Affect] : normal mood and affect [Able to Communicate] : able to communicate [Well Developed] : well developed [Well Nourished] : well nourished [NL (45)] : extension 45 degrees [Rotation to left] : rotation to left [Rotation to right] : rotation to right [Disc space narrowing] : Disc space narrowing [There are no fractures, subluxations or dislocations. No significant abnormalities are seen] : There are no fractures, subluxations or dislocations. No significant abnormalities are seen [Type 2 acromion] : Type 2 acromion [NL (20)] : MTP joint PF 20 degrees [5___] : eversion 5[unfilled]/5 [Right] : right foot [Degenerative change] : Degenerative change [FreeTextEntry1] : Multilevel DDD from C4-C7 [de-identified] : left lateral flexion 30 degrees [TWNoteComboBox6] : right lateral rotation 60 degrees [de-identified] : + Jessica [TWNoteComboBox4] : passive forward flexion 170 degrees [de-identified] : external rotation 85 degrees [] : no peroneal tendon tenderness [FreeTextEntry3] : small exostosis over 1st MT head [FreeTextEntry9] : Pain at extremes of PF [de-identified] : moderate to severe assymetric narrowing of the 1st MTP joint with bone spur formation [de-identified] : MTP joint DF 20 degrees

## 2024-09-06 ENCOUNTER — APPOINTMENT (OUTPATIENT)
Dept: ORTHOPEDIC SURGERY | Facility: CLINIC | Age: 59
End: 2024-09-06
Payer: COMMERCIAL

## 2024-09-06 VITALS — BODY MASS INDEX: 24.91 KG/M2 | WEIGHT: 155 LBS | HEIGHT: 66 IN

## 2024-09-06 DIAGNOSIS — S43.431A SUPERIOR GLENOID LABRUM LESION OF RIGHT SHOULDER, INITIAL ENCOUNTER: ICD-10-CM

## 2024-09-06 DIAGNOSIS — M47.812 SPONDYLOSIS W/OUT MYELOPATHY OR RADICULOPATHY, CERVICAL REGION: ICD-10-CM

## 2024-09-06 DIAGNOSIS — M75.01 ADHESIVE CAPSULITIS OF RIGHT SHOULDER: ICD-10-CM

## 2024-09-06 PROCEDURE — 99213 OFFICE O/P EST LOW 20 MIN: CPT

## 2024-09-06 NOTE — PLAN
[TextEntry] : The patient was advised of the diagnosis. The natural history of the pathology was explained in full to the patient in layman's terms. All questions were answered. The risks and benefits of surgical and non-surgical treatment alternatives were explained in full to the patient.   Patient was advised to continue with physical therapy.   Defers injection today for her right shoulder.  Will get back more consistently on the Feldene and see how she does with that.
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
## 2024-09-06 NOTE — PHYSICAL EXAM
[Normal Mood and Affect] : normal mood and affect [Able to Communicate] : able to communicate [Well Developed] : well developed [Well Nourished] : well nourished [NL (45)] : extension 45 degrees [Rotation to left] : rotation to left [Rotation to right] : rotation to right [Disc space narrowing] : Disc space narrowing [There are no fractures, subluxations or dislocations. No significant abnormalities are seen] : There are no fractures, subluxations or dislocations. No significant abnormalities are seen [Type 2 acromion] : Type 2 acromion [NL (20)] : MTP joint PF 20 degrees [5___] : eversion 5[unfilled]/5 [Right] : right foot [Degenerative change] : Degenerative change [FreeTextEntry1] : Multilevel DDD from C4-C7 [de-identified] : left lateral flexion 30 degrees [TWNoteComboBox6] : right lateral rotation 60 degrees [de-identified] : + Jessica [TWNoteComboBox4] : passive forward flexion 170 degrees [de-identified] : external rotation 85 degrees [] : no peroneal tendon tenderness [FreeTextEntry3] : small exostosis over 1st MT head [FreeTextEntry9] : Pain at extremes of PF [de-identified] : moderate to severe assymetric narrowing of the 1st MTP joint with bone spur formation [de-identified] : MTP joint DF 20 degrees

## 2024-12-16 ENCOUNTER — EMERGENCY (EMERGENCY)
Facility: HOSPITAL | Age: 59
LOS: 1 days | Discharge: HOME | End: 2024-12-16
Attending: EMERGENCY MEDICINE | Admitting: EMERGENCY MEDICINE
Payer: COMMERCIAL

## 2024-12-16 VITALS
HEIGHT: 66 IN | SYSTOLIC BLOOD PRESSURE: 147 MMHG | RESPIRATION RATE: 19 BRPM | HEART RATE: 83 BPM | TEMPERATURE: 98 F | DIASTOLIC BLOOD PRESSURE: 85 MMHG | WEIGHT: 170.86 LBS | OXYGEN SATURATION: 99 %

## 2024-12-16 VITALS
OXYGEN SATURATION: 96 % | RESPIRATION RATE: 20 BRPM | DIASTOLIC BLOOD PRESSURE: 80 MMHG | HEART RATE: 64 BPM | TEMPERATURE: 98 F | SYSTOLIC BLOOD PRESSURE: 128 MMHG

## 2024-12-16 PROCEDURE — 99284 EMERGENCY DEPT VISIT MOD MDM: CPT

## 2024-12-16 PROCEDURE — 99283 EMERGENCY DEPT VISIT LOW MDM: CPT

## 2024-12-16 RX ORDER — PREDNISONE 20 MG/1
60 TABLET ORAL ONCE
Refills: 0 | Status: COMPLETED | OUTPATIENT
Start: 2024-12-16 | End: 2024-12-16

## 2024-12-16 RX ORDER — FAMOTIDINE 20 MG/1
20 TABLET, FILM COATED ORAL ONCE
Refills: 0 | Status: COMPLETED | OUTPATIENT
Start: 2024-12-16 | End: 2024-12-16

## 2024-12-16 RX ORDER — EPINEPHRINE 1 MG/ML(1)
0.3 AMPUL (ML) INJECTION
Qty: 1 | Refills: 0
Start: 2024-12-16

## 2024-12-16 RX ORDER — PREDNISONE 20 MG/1
2 TABLET ORAL
Qty: 12 | Refills: 0
Start: 2024-12-16 | End: 2024-12-21

## 2024-12-16 RX ADMIN — PREDNISONE 60 MILLIGRAM(S): 20 TABLET ORAL at 01:09

## 2024-12-16 RX ADMIN — FAMOTIDINE 20 MILLIGRAM(S): 20 TABLET, FILM COATED ORAL at 01:09

## 2024-12-16 NOTE — ED PROVIDER NOTE - NSFOLLOWUPINSTRUCTIONS_ED_ALL_ED_FT
Angioedema  Angioedema is the sudden swelling of tissue in the body. Angioedema can affect any part of the body, including the legs, hands, genitals, face, mouth, lips, and internal organs, like your intestines.    Depending on the cause, angioedema may happen just once. However, some people may have repeated bouts of angioedema during their lives.    Symptoms may be mild and may occur along with other allergic symptoms such as itchy, red, swollen areas of skin (hives). Severe angioedema can be life-threatening if it affects the air passages and blocks breathing.    What are the causes?  This condition may be caused by:  Foods, such as milk, eggs, shellfish, wheat, or nuts.  Certain medicines, such as ACE inhibitors, birth control pills, dyes used in X-rays, or NSAIDs, such as ibuprofen.  Hereditary angioedema (HAE) is genetic. Episodes can be triggered by:  Illness, infection, or emotional or physical stress.  Changes in hormone levels.  Exercise.  Minor surgical or dental procedures.  In some cases, the cause of this condition is not known.    What increases the risk?  You are more likely to develop HAE if you have family members with this condition.    What are the signs or symptoms?  A hand with angioedema compared to a normal hand.   Symptoms of this condition depend on where the swelling happens.    Symptoms of this condition include:  Swollen skin.  Hives.  Pain, pressure, or tenderness in the affected area.  Swollen eyelids, face, lips, or tongue.  Trouble drinking, swallowing, or closing the mouth completely.  Hoarseness or sore throat.  Wheezing or trouble breathing.  If your internal organs are affected, symptoms may also include:  Nausea.  Pain in the abdomen.  Vomiting or diarrhea.  Trouble swallowing.  Trouble passing urine.  How is this diagnosed?  This condition may be diagnosed based on:  An exam of the affected area.  Your medical history.  Whether anyone in your family has had this condition before.  A review of any medicines you have been taking.  Tests, including:  Allergy skin tests to see if the condition was caused by an allergic reaction.  Blood tests to see if the condition was caused by certain inherited or genetic diseases.  How is this treated?  Treatment for this condition depends on the cause and severity of your symptoms. It may involve any of the following:  Avoiding triggers, if they are known. Triggers may include foods or environmental allergens.  Stopping medicines permanently if they cause the condition. These include ACE inhibitors.  Taking medicines to treat symptoms or prevent future episodes. These may include:  Antihistamines.  Epinephrine injections.  Steroids.  Blood products to treat specific types of non-allergic angioedema.  Breathing tubes or ventilators in severe cases in which breathing is affected.  Severe cases of angioedema are treated at the hospital. Mild to moderate angioedema usually gets better in 24–48 hours.    Follow these instructions at home:  Medical alert bracelet.  Take over-the-counter and prescription medicines only as told by your health care provider.  If you were given medicines for emergency allergy treatment, always carry them with you. This includes epinephrine injector kits.  Wear a medical bracelet as told by your health care provider.  If something triggers your condition, avoid the trigger. Triggers can be foods, environmental allergens, stress, or exercise.  Avoid all medicines that caused your angioedema. This is for your entire life.  If your condition is inherited and you are thinking about having children, talk to your health care provider. It is important to discuss the risks of passing on the condition to your children.  Where to find more information  American Academy of Allergy Asthma & Immunology: www.aaaai.org  Contact a health care provider if:  You continue to have repeated episodes of angioedema.  Episodes of angioedema start to happen more often than they used to, even after you take steps to prevent them.  You have episodes of angioedema that are more severe than they have been before, even after you take steps to prevent them.  You are thinking about having children.  Get help right away if:  You have severe swelling of your mouth, tongue, or lips.  Your swelling gets worse.  You have trouble breathing, swallowing, or talking.  You have chest pain, dizziness or light-headedness, or you pass out.  These symptoms may represent a serious problem that is an emergency. Do not wait to see if the symptoms will go away. Get medical help right away. Call your local emergency services (911 in the [place] Do not drive yourself to the hospital.    Summary  Angioedema is the sudden swelling of tissues.  It is important to be aware of all triggers or causes for your angioedema and to avoid them.  Treatment for this condition depends on the cause and severity of your symptoms.  Severe angioedema can be life-threatening if it blocks the air passages.  This information is not intended to replace advice given to you by your health care provider. Make sure you discuss any questions you have with your health care provider.

## 2024-12-16 NOTE — ED PROVIDER NOTE - CARE PROVIDER_API CALL
Your allergist,   Phone: (   )    -  Fax: (   )    -  Follow Up Time: 1-3 Days    Raza Christian  Allergy and Immunology  [address], [address] [place] [address]  Phone: [phone]  Fax: [phone]  Follow Up Time:

## 2024-12-16 NOTE — ED PROVIDER NOTE - PATIENT PORTAL LINK FT
You can access the FollowMyHealth Patient Portal offered by [facility] by registering at the following website: [email or web address]. By joining [employer]’s FollowMyHealth portal, you will also be able to view your health information using other applications (apps) compatible with our system.

## 2024-12-16 NOTE — ED PROVIDER NOTE - PROVIDER TOKENS
FREE:[LAST:[Your allergist],PHONE:[(   )    -],FAX:[(   )    -],FOLLOWUP:[1-3 Days]],PROVIDER:[TOKEN:[[id number]]]

## 2024-12-16 NOTE — ED PROVIDER NOTE - CARE PROVIDERS DIRECT ADDRESSES
,DirectAddress_Unknown,[email or web address]

## 2024-12-16 NOTE — ED PROVIDER NOTE - OBJECTIVE STATEMENT
Patient presents with complaints of tingling and swelling of her lips and sensation of her throat tightening for about 15 minutes prior to arrival.  Patient had just eaten some ice cream and was getting ready for bed when she felt the symptoms.  States this has happened to her multiple times before and sometimes worse than what she is feeling now.  Patient took 2 Benadryl's and states that she feels it has helped slightly.  Feels her voice is raspy but no shortness of breath.  Has not noticed any itchy rash anywhere.  Patient has seen an allergist and has been tested with no known etiology for her symptoms.

## 2024-12-16 NOTE — ED ADULT NURSE NOTE - TEMPLATE
Ambulatory to bathroom, steady gait.   Neurosurgery Progress Note  Jaclyn Lovelace, [facility]          Admit Date: 3/2/2022   LOS: 6 days        Daily Progress Note: 3/8/2022    POD: 6    S/P: Procedure(s):  AIRO GUIDED RIGHT PARIETAL CRANIOTOMY AND TUMOR REMOVAL    HPI: The patient presented to the hospital last week due to intermittent left leg weakness. His head CT revealed cerebral edema and he transferred to [facility]. His MRI brain revealed a large right parietal brain mass and hi CT scans of his body showed no other evidence of disease. He was discharged to home on steroids and keppra with the intent of a planned readmission for surgery today. He underwent a right parietal craniotomy with resection of tumor on 22. In the recovery room, he states he could not move his left arm or left leg. In the ICU, his left leg has begun working again to where he is antigravity and able to lift it off the bed. He cannot move his LUE. While in the room, he began having a focal seizure of his left arm with rhythmic movements that terminated without intervention after about 20 seconds. Subjective:   Pt insurance auth for rehab obtained. His sodium level is low this morning at 127. It has been 134, but dropped to 127 this morning and is still 127 on repeat labs. Oncology evaluated the patient today. He was able to lift his left arm from the chair to about chest height unassisted. Denies chest pain, leg pain, nausea, vomiting, difficulty swallowing, and dyspnea. Objective:     Vital signs  Temp (24hrs), Av.7 °F (36.5 °C), Min:97.5 °F (36.4 °C), Max:97.9 °F (36.6 °C)   No intake/output data recorded.    1901 -  0700  In: -   Out: 450 [Urine:450]    Visit Vitals  /80 (BP 1 Location: Right arm, BP Patient Position: At rest)   Pulse 74   Temp 97.9 °F (36.6 °C)   Resp 22   Ht 5' 4\" (1.626 m)   Wt 136.2 kg (300 lb 4.3 oz)   SpO2 95%   BMI 51.54 kg/m²    O2 Flow Rate (L/min): 0 l/min O2 Device: None (Room air)     Pain control  Pain Assessment  Pain Scale 1: Numeric (0 - 10)  Pain Intensity 1: 0  Pain Onset 1: post op  Pain Location 1: Head  Pain Orientation 1: Other (comment)  Pain Description 1: Constant  Pain Intervention(s) 1: Medication (see MAR),Emotional support,Environmental changes,Repositioned    PT/OT  Gait     Gait  Base of Support: Narrowed  Gait Abnormalities: Trunk sway increased (L knee buckling and hyperextension)  Ambulation - Level of Assistance: Moderate assistance,Assist x2  Distance (ft): 3 Feet (ft) (x3 reps )  Assistive Device: Gait belt           Physical Exam:  Gen:NAD. Neuro: A&Ox3. Follows commands. Speech clear. Affect bright  PERRL. EOMI. Face symmetric. Tongue midline. RUE/RLE 5/5, LUE 3/5 hand intrinsics, 3/5 biceps, triceps, 2/5 deltoid, LLE 4+/5  Sensation decreased on left. Gait deferred. Skin: Right scalp incision C/D/I with sutures in place. No erythema, edema, or drainage. CT head without contrast on 03/03/22 shows postop right craniotomy and tumor resection. No hemorrhage. Decreased midline shift. 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     24 hour results:    Recent Results (from the past 24 hour(s))   GLUCOSE, POC    Collection Time: 03/07/22 11:56 AM   Result Value Ref Range    Glucose (POC) 119 (H) 65 - 117 mg/dL    Performed by Genesis Thompson    GLUCOSE, POC    Collection Time: 03/07/22  5:00 PM   Result Value Ref Range    Glucose (POC) 126 (H) 65 - 117 mg/dL    Performed by Genesis Thompson    PHENYTOIN    Collection Time: 03/08/22  2:02 AM   Result Value Ref Range    Phenytoin 14.7 10.0 - 20.0 ug/mL   RENAL FUNCTION PANEL    Collection Time: 03/08/22  2:02 AM   Result Value Ref Range    Sodium 127 (L) 136 - 145 mmol/L    Potassium 3.5 3.5 - 5.1 mmol/L    Chloride 88 (L) 97 - 108 mmol/L    CO2 32 21 - 32 mmol/L    Anion gap 7 5 - 15 mmol/L    Glucose 201 (H) 65 - 100 mg/dL BUN 31 (H) 6 - 20 MG/DL    Creatinine 1.14 0.70 - 1.30 MG/DL    BUN/Creatinine ratio 27 (H) 12 - 20      GFR est AA >60 >60 ml/min/1.73m2    GFR est non-AA >60 >60 ml/min/1.73m2    Calcium 8.4 (L) 8.5 - 10.1 MG/DL    Phosphorus 3.3 2.6 - 4.7 MG/DL    Albumin 2.9 (L) 3.5 - 5.0 g/dL   CBC WITH AUTOMATED DIFF    Collection Time: 03/08/22  2:02 AM   Result Value Ref Range    WBC 20.8 (H) 4.1 - 11.1 K/uL    RBC 5.38 4.10 - 5.70 M/uL    HGB 16.5 12.1 - 17.0 g/dL    HCT 50.3 36.6 - 50.3 %    MCV 93.5 80.0 - 99.0 FL    MCH 30.7 26.0 - 34.0 PG    MCHC 32.8 30.0 - 36.5 g/dL    RDW 12.3 11.5 - 14.5 %    PLATELET 977 379 - 696 K/uL    MPV 10.4 8.9 - 12.9 FL    NRBC 0.0 0  WBC    ABSOLUTE NRBC 0.00 0.00 - 0.01 K/uL    NEUTROPHILS 77 (H) 32 - 75 %    LYMPHOCYTES 12 12 - 49 %    MONOCYTES 9 5 - 13 %    EOSINOPHILS 0 0 - 7 %    BASOPHILS 0 0 - 1 %    IMMATURE GRANULOCYTES 2 (H) 0.0 - 0.5 %    ABS. NEUTROPHILS 16.0 (H) 1.8 - 8.0 K/UL    ABS. LYMPHOCYTES 2.5 0.8 - 3.5 K/UL    ABS. MONOCYTES 1.8 (H) 0.0 - 1.0 K/UL    ABS. EOSINOPHILS 0.0 0.0 - 0.4 K/UL    ABS. BASOPHILS 0.1 0.0 - 0.1 K/UL    ABS. IMM.  GRANS. 0.3 (H) 0.00 - 0.04 K/UL    DF AUTOMATED     GLUCOSE, POC    Collection Time: 03/08/22  7:13 AM   Result Value Ref Range    Glucose (POC) 160 (H) 65 - 117 mg/dL    Performed by Christ Luong    METABOLIC PANEL, BASIC    Collection Time: 03/08/22  9:37 AM   Result Value Ref Range    Sodium 127 (L) 136 - 145 mmol/L    Potassium 3.5 3.5 - 5.1 mmol/L    Chloride 88 (L) 97 - 108 mmol/L    CO2 34 (H) 21 - 32 mmol/L    Anion gap 5 5 - 15 mmol/L    Glucose 140 (H) 65 - 100 mg/dL    BUN 28 (H) 6 - 20 MG/DL    Creatinine 0.96 0.70 - 1.30 MG/DL    BUN/Creatinine ratio 29 (H) 12 - 20      GFR est AA >60 >60 ml/min/1.73m2    GFR est non-AA >60 >60 ml/min/1.73m2    Calcium 8.7 8.5 - 10.1 MG/DL   SARS-COV-2    Collection Time: 03/08/22  9:39 AM   Result Value Ref Range    SARS-CoV-2 Please find results under separate order     COVID-19 RAPID TEST    Collection Time: 03/08/22  9:39 AM   Result Value Ref Range    Specimen source Nasopharyngeal      COVID-19 rapid test Not detected NOTD            Assessment:     Active Problems:    Brain mass (2/21/2022)        Plan:   1. Right parietal brain mass   - s/p crani 03/02   - cont decadron taper   - cont keppra   - PT/OT   - Pathology pending.    -Med onc and rad onc consulted. Tx would not start for 3-4 weeks  2. Cerebral edema with brain compression   - due to #1   - plans as above  3. Hx of CHF   - EF of 35% on last ECHO   - cont Coreg   - Hospitalist following  4. Focal seizures   - due to #1, 2   - cont keppra to 1000 mg bid   - PO dilantin 100 mg tid. Phenytoin level is therapeutic   - ativan PRN for seizures lasting greater than 2 minutes or more than 3 in an hour. 5. HTN   - SBP<140   - cont coreg, lisinopril from home. Torsemide restarted. - Hospitalist following  6. Diabetes mellitus, type 2   - -209   - SSI and accu-checks   - Hospitalist following  7. Morbid obesity as defined by BMI greater than 40   - Body mass index is 51.54 kg/m². - Diet and exercise counseling as appropriate  8. Hyponatremia   - Na 127 on repeat labs   - urine sodium and urine osmo pending   - will consult nephrology. Pt may need fluid restriction pending urine studies. Activity: up with assist  DVT ppx: SCDs  Dispo: inpt rehab    Plan d/w nurse, case management. Called rad onc to make them aware of the consult. Will discuss with Dr. Nohemy Gross. Inpt rehab has bed an auth but need to see what nephrology recommends. He may need one more day of observation with his sodium level before he goes to inpt rehab. Pepe Geller, NP   03/08/22 1210    Spoke with nephrology. Will enact fluid restriction. Repeat labs in am and if stable will be able to go to inpatient rehab tomorrow and be followed at [facility] by nephrology team. Updated case management.     Kurt [facility] Allergic [facility]

## 2024-12-18 NOTE — ED ADULT NURSE NOTE - PUPILS PERRL
Outpatient Physical Therapy  [place]           [x] Phone: [phone]   Fax: [phone]  [place]           [] Phone: [phone]   Fax: [phone]        Physical Therapy Daily Treatment Note  Date:  2024    Patient Name:  Emily He    :  1974  MRN: [id number]  Restrictions/Precautions: No data recorded      Diagnosis:   Neck pain [M54.2]    Date of Injury/Surgery:   Treatment Diagnosis:  cervical DDD and impringement L radiating symptoms. stiffness, hypomobility , posture  Insurance/Certification information: johann ( 30 PCY) - pre-cert after visit #8?  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 ind with HEP for postue and flexibility  met  2. reports 50% less L hand radiating symptoms  not met  3. cervical ROM Rot R and L  40 deg  met  4.  50% less TTP in the L lower cervical area and UT  not met  Long Term Goals  Time Frame for Long Term Goals: 6-8 weeks  1. report 75% better overall  in the neck and her L radiating symtoms with ADL and housework  not met  2. demo good cervical posture  met  3. L  improved to 50 #  met  4. cervical ROM Ext 45 flex 55 with no increaed neck pan or L radiating symptoms  not met  5. NDI improved to 11 pts or less   not met        Summary of Evaluation:  Assessment: Pt appears with DDD in the cervical area and osteophytes, she has EMG noting compression of C7-8 nerve root L. she reports that Dr Vaughn stated no surgery was indicated. she is having neck pain and L UE to the hand pain and tingling / weakness. She notes she is not able to tolerate her 
yes

## 2025-03-17 ENCOUNTER — EMERGENCY (EMERGENCY)
Facility: HOSPITAL | Age: 60
LOS: 1 days | Discharge: HOME | End: 2025-03-17
Attending: EMERGENCY MEDICINE | Admitting: EMERGENCY MEDICINE
Payer: COMMERCIAL

## 2025-03-17 VITALS
SYSTOLIC BLOOD PRESSURE: 123 MMHG | HEART RATE: 78 BPM | OXYGEN SATURATION: 98 % | RESPIRATION RATE: 18 BRPM | TEMPERATURE: 98 F | DIASTOLIC BLOOD PRESSURE: 78 MMHG

## 2025-03-17 VITALS
HEART RATE: 80 BPM | HEIGHT: 66 IN | TEMPERATURE: 98 F | SYSTOLIC BLOOD PRESSURE: 163 MMHG | WEIGHT: 165.35 LBS | OXYGEN SATURATION: 98 % | DIASTOLIC BLOOD PRESSURE: 84 MMHG | RESPIRATION RATE: 18 BRPM

## 2025-03-17 PROCEDURE — 99284 EMERGENCY DEPT VISIT MOD MDM: CPT

## 2025-03-17 PROCEDURE — 96374 THER/PROPH/DIAG INJ IV PUSH: CPT

## 2025-03-17 PROCEDURE — 99284 EMERGENCY DEPT VISIT MOD MDM: CPT | Mod: 25

## 2025-03-17 PROCEDURE — 96375 TX/PRO/DX INJ NEW DRUG ADDON: CPT

## 2025-03-17 RX ORDER — PREDNISONE 20 MG/1
2 TABLET ORAL
Qty: 10 | Refills: 0
Start: 2025-03-17 | End: 2025-03-21

## 2025-03-17 RX ORDER — METHYLPHENIDATE HCL 20 MG
0 TABLET, EXTENDED RELEASE ORAL
Refills: 0 | DISCHARGE

## 2025-03-17 RX ORDER — METHYLPREDNISOLONE ACETATE 80 MG/ML
125 INJECTION, SUSPENSION INTRA-ARTICULAR; INTRALESIONAL; INTRAMUSCULAR; SOFT TISSUE ONCE
Refills: 0 | Status: COMPLETED | OUTPATIENT
Start: 2025-03-17 | End: 2025-03-17

## 2025-03-17 RX ORDER — SPIRONOLACTONE 25 MG
0 TABLET ORAL
Refills: 0 | DISCHARGE

## 2025-03-17 RX ADMIN — METHYLPREDNISOLONE ACETATE 125 MILLIGRAM(S): 80 INJECTION, SUSPENSION INTRA-ARTICULAR; INTRALESIONAL; INTRAMUSCULAR; SOFT TISSUE at 01:25

## 2025-03-17 RX ADMIN — Medication 20 MILLIGRAM(S): at 01:25

## 2025-03-17 NOTE — ED ADULT NURSE NOTE - NSICDXPASTMEDICALHX_GEN_ALL_CORE_FT
PAST MEDICAL HISTORY:  ADHD     PCOS (polycystic ovarian syndrome)      PAST MEDICAL HISTORY:  ADHD     H/O angioedema     PCOS (polycystic ovarian syndrome)

## 2025-03-17 NOTE — ED PROVIDER NOTE - PROVIDER TOKENS
FREE:[LAST:[Follow Up With Dr. Cardenas, your primary care provider],PHONE:[(   )    -],FAX:[(   )    -],FOLLOWUP:[1-3 Days]]

## 2025-03-17 NOTE — ED PROVIDER NOTE - PROGRESS NOTE DETAILS
feels improvement, symptoms slight now, will continue to observe until pt feels improved and ready for discharge

## 2025-03-17 NOTE — ED PROVIDER NOTE - CLINICAL SUMMARY MEDICAL DECISION MAKING FREE TEXT BOX
allergic reaction, took benedryl at home, will give pepcid and solumedrol now, wants to hold off on epi, has never needed it before, will reassess symptoms, if worsening related to breathing/oropharynx will give epi

## 2025-03-17 NOTE — ED PROVIDER NOTE - CARE PROVIDER_API CALL
Follow Up With Dr. Cardenas, your primary care provider,   Phone: (   )    -  Fax: (   )    -  Follow Up Time: 1-3 Days

## 2025-03-17 NOTE — ED PROVIDER NOTE - PATIENT PORTAL LINK FT
You can access the FollowMyHealth Patient Portal offered by [facility] by registering at the following website: [email or web address]. By joining [employer]’s FollowMyHealth portal, you will also be able to view your health information using other applications (apps) compatible with our system.

## 2025-03-17 NOTE — ED ADULT NURSE NOTE - LOCATION
Apply ketoconazole cream to the affected area of your groin once daily for 2 weeks.    Please follow-up with a primary care provider in 1-2 days.   throat tightness, lip swelling 20 min age took 2 tim at 1230

## 2025-03-17 NOTE — ED PROVIDER NOTE - NS_EDPROVIDERDISPOUSERTYPE_ED_A_ED
"Well  - 24 Months Old  PHYSICAL DEVELOPMENT  Your 24-month-old may begin to show a preference for using one hand over the other. At this age he or she can:   · Walk and run.    · Kick a ball while standing without losing his or her balance.  · Jump in place and jump off a bottom step with two feet.  · Hold or pull toys while walking.    · Climb on and off furniture.    · Turn a door knob.  · Walk up and down stairs one step at a time.    · Unscrew lids that are secured loosely.    · Build a tower of five or more blocks.    · Turn the pages of a book one page at a time.  SOCIAL AND EMOTIONAL DEVELOPMENT  Your child:   · Demonstrates increasing independence exploring his or her surroundings.    · May continue to show some fear (anxiety) when  from parents and in new situations.    · Frequently communicates his or her preferences through use of the word \"no.\"    · May have temper tantrums. These are common at this age.    · Likes to imitate the behavior of adults and older children.  · Initiates play on his or her own.  · May begin to play with other children.    · Shows an interest in participating in common household activities    · Shows possessiveness for toys and understands the concept of \"mine.\" Sharing at this age is not common.    · Starts make-believe or imaginary play (such as pretending a bike is a motorcycle or pretending to cook some food).  COGNITIVE AND LANGUAGE DEVELOPMENT  At 24 months, your child:  · Can point to objects or pictures when they are named.  · Can recognize the names of familiar people, pets, and body parts.    · Can say 50 or more words and make short sentences of at least 2 words. Some of your child's speech may be difficult to understand.    · Can ask you for food, for drinks, or for more with words.  · Refers to himself or herself by name and may use I, you, and me, but not always correctly.  · May stutter. This is common.  · May repeat words overheard during other " "people's conversations.    · Can follow simple two-step commands (such as \"get the ball and throw it to me\").    · Can identify objects that are the same and sort objects by shape and color.  · Can find objects, even when they are hidden from sight.  ENCOURAGING DEVELOPMENT  · Recite nursery rhymes and sing songs to your child.    · Read to your child every day. Encourage your child to point to objects when they are named.    · Name objects consistently and describe what you are doing while bathing or dressing your child or while he or she is eating or playing.    · Use imaginative play with dolls, blocks, or common household objects.  · Allow your child to help you with household and daily chores.  · Provide your child with physical activity throughout the day. (For example, take your child on short walks or have him or her play with a ball or ambrose bubbles.)  · Provide your child with opportunities to play with children who are similar in age.  · Consider sending your child to .  · Minimize television and computer time to less than 1 hour each day. Children at this age need active play and social interaction. When your child does watch television or play on the computer, do it with him or her. Ensure the content is age-appropriate. Avoid any content showing violence.  · Introduce your child to a second language if one spoken in the household.    ROUTINE IMMUNIZATIONS  · Hepatitis B vaccine. Doses of this vaccine may be obtained, if needed, to catch up on missed doses.    · Diphtheria and tetanus toxoids and acellular pertussis (DTaP) vaccine. Doses of this vaccine may be obtained, if needed, to catch up on missed doses.    · Haemophilus influenzae type b (Hib) vaccine. Children with certain high-risk conditions or who have missed a dose should obtain this vaccine.    · Pneumococcal conjugate (PCV13) vaccine. Children who have certain conditions, missed doses in the past, or obtained the 7-valent " pneumococcal vaccine should obtain the vaccine as recommended.    · Pneumococcal polysaccharide (PPSV23) vaccine. Children who have certain high-risk conditions should obtain the vaccine as recommended.    · Inactivated poliovirus vaccine. Doses of this vaccine may be obtained, if needed, to catch up on missed doses.    · Influenza vaccine. Starting at age 6 months, all children should obtain the influenza vaccine every year. Children between the ages of 6 months and 8 years who receive the influenza vaccine for the first time should receive a second dose at least 4 weeks after the first dose. Thereafter, only a single annual dose is recommended.    · Measles, mumps, and rubella (MMR) vaccine. Doses should be obtained, if needed, to catch up on missed doses. A second dose of a 2-dose series should be obtained at age 4-6 years. The second dose may be obtained before 4 years of age if that second dose is obtained at least 4 weeks after the first dose.    · Varicella vaccine. Doses may be obtained, if needed, to catch up on missed doses. A second dose of a 2-dose series should be obtained at age 4-6 years. If the second dose is obtained before 4 years of age, it is recommended that the second dose be obtained at least 3 months after the first dose.    · Hepatitis A vaccine. Children who obtained 1 dose before age 24 months should obtain a second dose 6-18 months after the first dose. A child who has not obtained the vaccine before 24 months should obtain the vaccine if he or she is at risk for infection or if hepatitis A protection is desired.    · Meningococcal conjugate vaccine. Children who have certain high-risk conditions, are present during an outbreak, or are traveling to a country with a high rate of meningitis should receive this vaccine.  TESTING  Your child's health care provider may screen your child for anemia, lead poisoning, tuberculosis, high cholesterol, and autism, depending upon risk factors.  Attending Attestation (For Attendings USE Only)... Starting at this age, your child's health care provider will measure body mass index (BMI) annually to screen for obesity.  NUTRITION  · Instead of giving your child whole milk, give him or her reduced-fat, 2%, 1%, or skim milk.    · Daily milk intake should be about 2-3 c (480-720 mL).    · Limit daily intake of juice that contains vitamin C to 4-6 oz (120-180 mL). Encourage your child to drink water.    · Provide a balanced diet. Your child's meals and snacks should be healthy.    · Encourage your child to eat vegetables and fruits.    · Do not force your child to eat or to finish everything on his or her plate.    · Do not give your child nuts, hard candies, popcorn, or chewing gum because these may cause your child to choke.    · Allow your child to feed himself or herself with utensils.  ORAL HEALTH  · Brush your child's teeth after meals and before bedtime.    · Take your child to a dentist to discuss oral health. Ask if you should start using fluoride toothpaste to clean your child's teeth.  · Give your child fluoride supplements as directed by your child's health care provider.    · Allow fluoride varnish applications to your child's teeth as directed by your child's health care provider.    · Provide all beverages in a cup and not in a bottle. This helps to prevent tooth decay.  · Check your child's teeth for brown or white spots on teeth (tooth decay).  · If your child uses a pacifier, try to stop giving it to your child when he or she is awake.  SKIN CARE  Protect your child from sun exposure by dressing your child in weather-appropriate clothing, hats, or other coverings and applying sunscreen that protects against UVA and UVB radiation (SPF 15 or higher). Reapply sunscreen every 2 hours. Avoid taking your child outdoors during peak sun hours (between 10 AM and 2 PM). A sunburn can lead to more serious skin problems later in life.  TOILET TRAINING  When your child becomes aware of wet or soiled diapers  "and stays dry for longer periods of time, he or she may be ready for toilet training. To toilet train your child:   · Let your child see others using the toilet.    · Introduce your child to a potty chair.    · Give your child lots of praise when he or she successfully uses the potty chair.    Some children will resist toiling and may not be trained until 3 years of age. It is normal for boys to become toilet trained later than girls. Talk to your health care provider if you need help toilet training your child. Do not force your child to use the toilet.  SLEEP  · Children this age typically need 12 or more hours of sleep per day and only take one nap in the afternoon.  · Keep nap and bedtime routines consistent.    · Your child should sleep in his or her own sleep space.    PARENTING TIPS  · Praise your child's good behavior with your attention.  · Spend some one-on-one time with your child daily. Vary activities. Your child's attention span should be getting longer.  · Set consistent limits. Keep rules for your child clear, short, and simple.  · Discipline should be consistent and fair. Make sure your child's caregivers are consistent with your discipline routines.    · Provide your child with choices throughout the day. When giving your child instructions (not choices), avoid asking your child yes and no questions (\"Do you want a bath?\") and instead give clear instructions (\"Time for a bath.\").  · Recognize that your child has a limited ability to understand consequences at this age.  · Interrupt your child's inappropriate behavior and show him or her what to do instead. You can also remove your child from the situation and engage your child in a more appropriate activity.  · Avoid shouting or spanking your child.  · If your child cries to get what he or she wants, wait until your child briefly calms down before giving him or her the item or activity. Also, model the words you child should use (for example " "\"cookie please\" or \"climb up\").    · Avoid situations or activities that may cause your child to develop a temper tantrum, such as shopping trips.  SAFETY  · Create a safe environment for your child.    ¨ Set your home water heater at 120°F (49°C).    ¨ Provide a tobacco-free and drug-free environment.    ¨ Equip your home with smoke detectors and change their batteries regularly.    ¨ Install a gate at the top of all stairs to help prevent falls. Install a fence with a self-latching gate around your pool, if you have one.    ¨ Keep all medicines, poisons, chemicals, and cleaning products capped and out of the reach of your child.    ¨ Keep knives out of the reach of children.  ¨ If guns and ammunition are kept in the home, make sure they are locked away separately.    ¨ Make sure that televisions, bookshelves, and other heavy items or furniture are secure and cannot fall over on your child.  · To decrease the risk of your child choking and suffocating:    ¨ Make sure all of your child's toys are larger than his or her mouth.    ¨ Keep small objects, toys with loops, strings, and cords away from your child.    ¨ Make sure the plastic piece between the ring and nipple of your child pacifier (pacifier shield) is at least 1½ inches (3.8 cm) wide.    ¨ Check all of your child's toys for loose parts that could be swallowed or choked on.    · Immediately empty water in all containers, including bathtubs, after use to prevent drowning.  · Keep plastic bags and balloons away from children.  · Keep your child away from moving vehicles. Always check behind your vehicles before backing up to ensure your child is in a safe place away from your vehicle.     · Always put a helmet on your child when he or she is riding a tricycle.    · Children 2 years or older should ride in a forward-facing car seat with a harness. Forward-facing car seats should be placed in the rear seat. A child should ride in a forward-facing car seat with a " harness until reaching the upper weight or height limit of the car seat.    · Be careful when handling hot liquids and sharp objects around your child. Make sure that handles on the stove are turned inward rather than out over the edge of the stove.    · Supervise your child at all times, including during bath time. Do not expect older children to supervise your child.    · Know the number for poison control in your area and keep it by the phone or on your refrigerator.  WHAT'S NEXT?  Your next visit should be when your child is 30 months old.      This information is not intended to replace advice given to you by your health care provider. Make sure you discuss any questions you have with your health care provider.     Document Released: 01/07/2008 Document Revised: 2016 Document Reviewed: 08/29/2014  [employer] Interactive Patient Education ©2016 [employer]

## 2025-03-17 NOTE — ED PROVIDER NOTE - OBJECTIVE STATEMENT
60y F c/o allergic reaction, started after eating spicy hernandez around 11, felt tongue swelling, lip swelling, dry throat, throat closing, some respiratory discomfort, took 2 benedryl, right now feels symptoms are a bit improved, lips still a little swollen and throat a little tight, but better than earlier 60y F c/o allergic reaction, started after eating spicy hernandez around 11, felt tongue swelling, lip swelling, dry throat, throat closing, some respiratory discomfort, took 2 benedryl, right now feels symptoms are a bit improved, lips still a little swollen and throat a little tight, but better than earlier, has had this 8-9 times before, no consistent trigger, has had allergy testing without clear finding, possibly enzyme related angioedema

## 2025-03-17 NOTE — ED ADULT NURSE NOTE - NSFALLUNIVINTERV_ED_ALL_ED
Bed/Stretcher in lowest position, wheels locked, appropriate side rails in place/Call bell, personal items and telephone in reach/Instruct patient to call for assistance before getting out of bed/chair/stretcher/Non-slip footwear applied when patient is off stretcher/[place] to call system/Physically safe environment - no spills, clutter or unnecessary equipment/Purposeful proactive rounding/Room/bathroom lighting operational, light cord in reach

## 2025-06-05 ENCOUNTER — APPOINTMENT (OUTPATIENT)
Dept: ORTHOPEDIC SURGERY | Facility: CLINIC | Age: 60
End: 2025-06-05
Payer: COMMERCIAL

## 2025-06-05 VITALS — BODY MASS INDEX: 24.91 KG/M2 | WEIGHT: 155 LBS | HEIGHT: 66 IN

## 2025-06-05 DIAGNOSIS — M23.91 UNSPECIFIED INTERNAL DERANGEMENT OF RIGHT KNEE: ICD-10-CM

## 2025-06-05 PROCEDURE — 73562 X-RAY EXAM OF KNEE 3: CPT | Mod: RT

## 2025-06-05 PROCEDURE — 99213 OFFICE O/P EST LOW 20 MIN: CPT | Mod: 25

## 2025-06-05 PROCEDURE — 20610 DRAIN/INJ JOINT/BURSA W/O US: CPT | Mod: RT

## 2025-06-19 NOTE — ED PROVIDER NOTE - CPE EDP ENMT NORM
[FreeTextEntry1] : IMP: S/p excision of abdominal wall epidermal inclusion cyst on 2/28/16. 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 Completed course of levaquin.  Persistent left bursal infection   PLAN Repeat levaquin 500 mg daily Follow-up with orthpedic MD      
[FreeTextEntry1] : IMP: S/p excision of abdominal wall epidermal inclusion cyst on 2/28/16. 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 Completed course of levaquin.  Persistent left bursal infection   PLAN Repeat levaquin 500 mg daily Follow-up with orthpedic MD      
normal...